# Patient Record
Sex: FEMALE | Race: WHITE | Employment: PART TIME | URBAN - METROPOLITAN AREA
[De-identification: names, ages, dates, MRNs, and addresses within clinical notes are randomized per-mention and may not be internally consistent; named-entity substitution may affect disease eponyms.]

---

## 2021-10-08 ENCOUNTER — OFFICE VISIT (OUTPATIENT)
Dept: FAMILY MEDICINE CLINIC | Age: 36
End: 2021-10-08
Payer: COMMERCIAL

## 2021-10-08 VITALS
HEART RATE: 92 BPM | DIASTOLIC BLOOD PRESSURE: 86 MMHG | HEIGHT: 65 IN | WEIGHT: 278 LBS | OXYGEN SATURATION: 98 % | TEMPERATURE: 97.3 F | SYSTOLIC BLOOD PRESSURE: 132 MMHG | BODY MASS INDEX: 46.32 KG/M2 | RESPIRATION RATE: 16 BRPM

## 2021-10-08 DIAGNOSIS — Z00.00 ENCOUNTER FOR WELL ADULT EXAM WITHOUT ABNORMAL FINDINGS: Primary | ICD-10-CM

## 2021-10-08 DIAGNOSIS — M62.838 TRAPEZIUS MUSCLE SPASM: ICD-10-CM

## 2021-10-08 DIAGNOSIS — E78.00 HYPERCHOLESTEREMIA: ICD-10-CM

## 2021-10-08 DIAGNOSIS — Z11.59 ENCOUNTER FOR HEPATITIS C SCREENING TEST FOR LOW RISK PATIENT: ICD-10-CM

## 2021-10-08 DIAGNOSIS — F31.9 BIPOLAR 1 DISORDER, DEPRESSED (HCC): ICD-10-CM

## 2021-10-08 DIAGNOSIS — G89.29 CHRONIC PAIN OF LEFT KNEE: ICD-10-CM

## 2021-10-08 DIAGNOSIS — M25.562 CHRONIC PAIN OF LEFT KNEE: ICD-10-CM

## 2021-10-08 DIAGNOSIS — Z11.4 SCREENING FOR HIV (HUMAN IMMUNODEFICIENCY VIRUS): ICD-10-CM

## 2021-10-08 DIAGNOSIS — E03.9 ACQUIRED HYPOTHYROIDISM: ICD-10-CM

## 2021-10-08 DIAGNOSIS — R73.03 PREDIABETES: ICD-10-CM

## 2021-10-08 DIAGNOSIS — M79.89 SWELLING OF LOWER EXTREMITY: ICD-10-CM

## 2021-10-08 PROCEDURE — 6360000002 HC RX W HCPCS

## 2021-10-08 PROCEDURE — G8482 FLU IMMUNIZE ORDER/ADMIN: HCPCS | Performed by: FAMILY MEDICINE

## 2021-10-08 PROCEDURE — G8427 DOCREV CUR MEDS BY ELIG CLIN: HCPCS | Performed by: FAMILY MEDICINE

## 2021-10-08 PROCEDURE — 99203 OFFICE O/P NEW LOW 30 MIN: CPT | Performed by: FAMILY MEDICINE

## 2021-10-08 PROCEDURE — 99204 OFFICE O/P NEW MOD 45 MIN: CPT | Performed by: FAMILY MEDICINE

## 2021-10-08 PROCEDURE — 90686 IIV4 VACC NO PRSV 0.5 ML IM: CPT

## 2021-10-08 PROCEDURE — G8417 CALC BMI ABV UP PARAM F/U: HCPCS | Performed by: FAMILY MEDICINE

## 2021-10-08 PROCEDURE — G0008 ADMIN INFLUENZA VIRUS VAC: HCPCS

## 2021-10-08 PROCEDURE — 4004F PT TOBACCO SCREEN RCVD TLK: CPT | Performed by: FAMILY MEDICINE

## 2021-10-08 RX ORDER — TRAZODONE HYDROCHLORIDE 100 MG/1
100 TABLET ORAL NIGHTLY
COMMUNITY
End: 2022-02-10 | Stop reason: SDUPTHER

## 2021-10-08 RX ORDER — CLONIDINE HYDROCHLORIDE 0.1 MG/1
0.1 TABLET ORAL DAILY
COMMUNITY

## 2021-10-08 RX ORDER — DULOXETIN HYDROCHLORIDE 60 MG/1
60 CAPSULE, DELAYED RELEASE ORAL DAILY
COMMUNITY
End: 2022-02-10

## 2021-10-08 RX ORDER — LAMOTRIGINE 100 MG/1
100 TABLET ORAL DAILY
COMMUNITY
End: 2022-02-10 | Stop reason: SDUPTHER

## 2021-10-08 RX ORDER — GABAPENTIN 400 MG/1
400 CAPSULE ORAL 3 TIMES DAILY
COMMUNITY
End: 2022-02-08 | Stop reason: SDUPTHER

## 2021-10-08 RX ORDER — FUROSEMIDE 20 MG/1
20 TABLET ORAL DAILY
Qty: 30 TABLET | Refills: 3 | Status: SHIPPED
Start: 2021-10-08 | End: 2022-02-10 | Stop reason: SDUPTHER

## 2021-10-08 RX ORDER — HYDROXYZINE 50 MG/1
50 TABLET, FILM COATED ORAL 3 TIMES DAILY PRN
COMMUNITY
End: 2022-02-10 | Stop reason: SDUPTHER

## 2021-10-08 RX ORDER — DULOXETIN HYDROCHLORIDE 30 MG/1
30 CAPSULE, DELAYED RELEASE ORAL DAILY
COMMUNITY
End: 2022-02-10

## 2021-10-08 RX ORDER — LEVOTHYROXINE SODIUM 0.05 MG/1
50 TABLET ORAL DAILY
COMMUNITY
End: 2022-02-10 | Stop reason: SDUPTHER

## 2021-10-08 RX ORDER — QUETIAPINE FUMARATE 25 MG/1
25 TABLET, FILM COATED ORAL DAILY
COMMUNITY
End: 2022-02-10 | Stop reason: SDUPTHER

## 2021-10-08 RX ORDER — METHOCARBAMOL 750 MG/1
750 TABLET, FILM COATED ORAL 4 TIMES DAILY
COMMUNITY
End: 2021-10-08

## 2021-10-08 RX ORDER — FUROSEMIDE 20 MG/1
20 TABLET ORAL DAILY
COMMUNITY
End: 2021-10-08 | Stop reason: CLARIF

## 2021-10-08 RX ORDER — PRAZOSIN HYDROCHLORIDE 2 MG/1
2 CAPSULE ORAL NIGHTLY
COMMUNITY
End: 2022-02-10 | Stop reason: SDUPTHER

## 2021-10-08 ASSESSMENT — COLUMBIA-SUICIDE SEVERITY RATING SCALE - C-SSRS
1. WITHIN THE PAST MONTH, HAVE YOU WISHED YOU WERE DEAD OR WISHED YOU COULD GO TO SLEEP AND NOT WAKE UP?: YES
6. HAVE YOU EVER DONE ANYTHING, STARTED TO DO ANYTHING, OR PREPARED TO DO ANYTHING TO END YOUR LIFE?: YES
2. HAVE YOU ACTUALLY HAD ANY THOUGHTS OF KILLING YOURSELF?: NO
7. DID THIS OCCUR IN THE LAST THREE MONTHS: NO

## 2021-10-08 ASSESSMENT — ENCOUNTER SYMPTOMS
SHORTNESS OF BREATH: 0
ABDOMINAL PAIN: 0
NAUSEA: 0
DIARRHEA: 0
WHEEZING: 0
COUGH: 0
VOMITING: 0
CONSTIPATION: 0

## 2021-10-08 ASSESSMENT — PATIENT HEALTH QUESTIONNAIRE - PHQ9
7. TROUBLE CONCENTRATING ON THINGS, SUCH AS READING THE NEWSPAPER OR WATCHING TELEVISION: 3
1. LITTLE INTEREST OR PLEASURE IN DOING THINGS: 3
8. MOVING OR SPEAKING SO SLOWLY THAT OTHER PEOPLE COULD HAVE NOTICED. OR THE OPPOSITE, BEING SO FIGETY OR RESTLESS THAT YOU HAVE BEEN MOVING AROUND A LOT MORE THAN USUAL: 3
SUM OF ALL RESPONSES TO PHQ QUESTIONS 1-9: 27
SUM OF ALL RESPONSES TO PHQ QUESTIONS 1-9: 27
4. FEELING TIRED OR HAVING LITTLE ENERGY: 3
5. POOR APPETITE OR OVEREATING: 3
3. TROUBLE FALLING OR STAYING ASLEEP: 3
SUM OF ALL RESPONSES TO PHQ9 QUESTIONS 1 & 2: 6
10. IF YOU CHECKED OFF ANY PROBLEMS, HOW DIFFICULT HAVE THESE PROBLEMS MADE IT FOR YOU TO DO YOUR WORK, TAKE CARE OF THINGS AT HOME, OR GET ALONG WITH OTHER PEOPLE: 2
6. FEELING BAD ABOUT YOURSELF - OR THAT YOU ARE A FAILURE OR HAVE LET YOURSELF OR YOUR FAMILY DOWN: 3
9. THOUGHTS THAT YOU WOULD BE BETTER OFF DEAD, OR OF HURTING YOURSELF: 3
2. FEELING DOWN, DEPRESSED OR HOPELESS: 3
SUM OF ALL RESPONSES TO PHQ QUESTIONS 1-9: 24

## 2021-10-08 NOTE — PATIENT INSTRUCTIONS
Patient Education        Neck Spasm: Exercises  Introduction  Here are some examples of exercises for you to try. The exercises may be suggested for a condition or for rehabilitation. Start each exercise slowly. Ease off the exercises if you start to have pain. You will be told when to start these exercises and which ones will work best for you. How to do the exercises  Levator scapula stretch    1. Sit in a firm chair, or stand up straight. 2. Gently tilt your head toward your left shoulder. 3. Turn your head to look down into your armpit, bending your head slightly forward. Let the weight of your head stretch your neck muscles. 4. Hold for 15 to 30 seconds. 5. Return to your starting position. 6. Follow the same instructions above, but tilt your head toward your right shoulder. 7. Repeat 2 to 4 times toward each shoulder. Upper trapezius stretch    1. Sit in a firm chair, or stand up straight. 2. This stretch works best if you keep your shoulder down as you lean away from it. To help you remember to do this, start by relaxing your shoulders and lightly holding on to your thighs or your chair. 3. Tilt your head toward your shoulder and hold for 15 to 30 seconds. Let the weight of your head stretch your muscles. 4. If you would like a little added stretch, place your arm behind your back. Use the arm opposite of the direction you are tilting your head. For example, if you are tilting your head to the left, place your right arm behind your back. 5. Repeat 2 to 4 times toward each shoulder. Neck rotation    1. Sit in a firm chair, or stand up straight. 2. Keeping your chin level, turn your head to the right, and hold for 15 to 30 seconds. 3. Turn your head to the left, and hold for 15 to 30 seconds. 4. Repeat 2 to 4 times to each side. Chin tuck    1. Lie on the floor with a rolled-up towel under your neck. Your head should be touching the floor.   2. Slowly bring your chin toward the front of your neck.  3. Hold for a count of 6, and then relax for up to 10 seconds. 4. Repeat 8 to 12 times. Forward neck flexion    1. Sit in a firm chair, or stand up straight. 2. Bend your head forward. 3. Hold for 15 to 30 seconds, then return to your starting position. 4. Repeat 2 to 4 times. Follow-up care is a key part of your treatment and safety. Be sure to make and go to all appointments, and call your doctor if you are having problems. It's also a good idea to know your test results and keep a list of the medicines you take. Where can you learn more? Go to [email or web address] and sign in to your [employer] account. Enter P962 in the Tekora box to learn more about \"Neck Spasm: Exercises. \"     If you do not have an account, please click on the \"Sign Up Now\" link. Current as of: July 1, 2021               Content Version: 13.0  © 2006-2021 Healthwise[employer] Inc[employer]. Care instructions adapted under license by [facility] ([facility]). If you have questions about a medical condition or this instruction, always ask your healthcare professional. [place] [address] any warranty or liability for your use of this information.

## 2021-10-08 NOTE — PROGRESS NOTES
[facility]  FAMILY MEDICINE RESIDENCY PROGRAM  DATE OF VISIT : 10/8/2021    Patient : Darryl Freeman   Age : 39 y.o.  : 1985   MRN : <[id number]>   ______________________________________________________________________    Chief Complaint :   Chief Complaint   Patient presents with    New Patient    Knee Pain     left    Other     upper back pain        HPI : Darryl Freeman is 39 y.o. female who presented to the clinic today for new patient encounter. Was told she had elevated cholesterol and was prediabetic prior to leaving [facility]. Moved here early august.     Anxiety: Hydroxizine 50mg TID (only takes 100mg in the am, intermittently takes another 50mg if absolutely necessary). Bipolar currently Depressed: Cymbalta 90mg daily (30mg + 60mg). SIGECAPS completely positive. Previous history of suicide attempts x1 about [id number]. She continues to have ideations and intermittent urges but denies having a plan and notes she notes that having her kids back and her success of finally having the power to do a fresh start and doing it for her kids keeps her from having a plan and going through. She feels she has insight of her emotions now and is working through them. Mood disorder (bipolar, borderline personality disorder): Lamictal 100mg daily. Quietapine and Seroquel as needed. Hypothyroidism: Levothyroxine 50mcg daily. Diagnosed for about 9yrs. Previous history of nephrolithiasis with SABINA. No issues since. Recurrent back spasms. Methocarbamol does not seem to be helping. Left knee pain: fell in April, was walking and her left knee just gave out and she fell forward, states her knee twisted outwards. During the fall she also sustained a fracture of her left wrist that pinged a nerve and required ORIF. States her knee feels weak and intermittenlty gives out on her sicne. Patient notes she has significant lower extremity swelling if she stops her lasix.  Has been on 20mg daily of lasix for about 2.5yrs. I reviewed the patient's past medications, allergies and past medical history during this visit. Past Medical History :    Health Maintenance-   Colonoscopy- - for IBS diagnosis    Ob/Gyn:  Agustina Barreto   LMP- 2021  Last PAP smear- about 9yrs- all abnormal in the past (precancerous cells, HPV, had previous colposcopies and leeps)   Ob Hx- - miscarriage. Both uncomplicated c-sections   Mammogram- NA      Past Medical History:   Diagnosis Date    Anxiety     Depression     Hyperlipidemia     Hypothyroidism     Mood disorder (HCC)      Past Surgical History:   Procedure Laterality Date     SECTION      CHOLECYSTECTOMY      FRACTURE SURGERY      left arm        Social History :  Social History     Tobacco History     Smoking Status  Current Every Day Smoker    Smokeless Tobacco Use  Never Used          Alcohol History     Alcohol Use Status  Not Asked          Drug Use     Drug Use Status  Not Asked          Sexual Activity     Sexually Active  Not Asked                 Allergies :   No Known Allergies    Medication List :    Current Outpatient Medications   Medication Sig Dispense Refill    gabapentin (NEURONTIN) 400 MG capsule Take 400 mg by mouth 3 times daily.       DULoxetine (CYMBALTA) 30 MG extended release capsule Take 30 mg by mouth daily      DULoxetine (CYMBALTA) 60 MG extended release capsule Take 60 mg by mouth daily      levothyroxine (SYNTHROID) 50 MCG tablet Take 50 mcg by mouth Daily      lamoTRIgine (LAMICTAL) 100 MG tablet Take 100 mg by mouth daily      hydrOXYzine (ATARAX) 50 MG tablet Take 50 mg by mouth 3 times daily as needed for Itching      prazosin (MINIPRESS) 2 MG capsule Take 2 mg by mouth nightly      QUEtiapine (SEROQUEL) 25 MG tablet Take 25 mg by mouth daily      traZODone (DESYREL) 100 MG tablet Take 100 mg by mouth nightly      cloNIDine (CATAPRES) 0.1 MG tablet Take 0.1 mg by mouth daily      furosemide (LASIX) 20 MG tablet Take 1 tablet by mouth daily 30 tablet 3     No current facility-administered medications for this visit. Review of Systems :  Review of Systems   Constitutional: Negative for chills, fatigue and fever. Respiratory: Negative for cough, shortness of breath and wheezing. Cardiovascular: Negative for chest pain, palpitations and leg swelling. Gastrointestinal: Negative for abdominal pain, constipation, diarrhea, nausea and vomiting. Endocrine: Negative for polydipsia and polyuria. Genitourinary: Negative for dysuria. Neurological: Negative for dizziness, weakness, light-headedness, numbness and headaches. Psychiatric/Behavioral: Positive for self-injury and suicidal ideas. Negative for behavioral problems, decreased concentration, dysphoric mood and sleep disturbance. The patient is not nervous/anxious. ______________________________________________________________________    Physical Exam :    Vitals: /86 (Site: Right Upper Arm, Position: Sitting, Cuff Size: Medium Adult)   Pulse 92   Temp 97.3 °F (36.3 °C) (Temporal)   Resp 16   Ht 5' 4.6\" (1.641 m)   Wt 278 lb (126.1 kg)   LMP 08/15/2021   SpO2 98%   BMI 46.84 kg/m²   Physical Exam  Vitals reviewed. Constitutional:       General: She is not in acute distress. Appearance: Normal appearance. Cardiovascular:      Rate and Rhythm: Normal rate. Pulses: Normal pulses. Heart sounds: No murmur heard. Pulmonary:      Effort: Pulmonary effort is normal. No respiratory distress. Breath sounds: Normal breath sounds. No wheezing. Abdominal:      General: Bowel sounds are normal. There is no distension. Palpations: Abdomen is soft. Tenderness: There is no abdominal tenderness. Musculoskeletal:      Right lower leg: No edema. Left lower leg: No edema. Comments:  right trapezius muscle spasm to palpation   Left knee  - + crepitus, ttp over lateral joint line .  No swelling . Abnormal    Neurological:      General: No focal deficit present. Mental Status: She is alert. Psychiatric:         Attention and Perception: Attention normal.         Mood and Affect: Mood is anxious and depressed. Affect is tearful. Speech: Speech normal.         Behavior: Behavior normal. Behavior is cooperative. Thought Content: Thought content normal.         ___________________    Assessment & Plan :    1. Bipolar 1 disorder, depressed ([facility] [address]  -Currently depressed  -Suicidal ideations without plan or intention  -Currently on medications as described above  -Continue present management  -Extensive amount of counseling done during visit  -Patient recommended to start counseling recommended psych care for both counseling for her and her daughter as well as family counseling as needed    2. Prediabetes  -Previously told in the past that she was diabetic  -Never been on any medications  - COMPREHENSIVE METABOLIC PANEL; Future  - HEMOGLOBIN A1C; Future    3. Acquired hypothyroidism  -On levothyroxine 50 mcg daily  -Previously controlled for patient  -Recent weight gain  - TSH without Reflex; Future    4. Hypercholesteremia  -Previous told she had elevated cholesterol  -Never started on any medications  - LIPID PANEL; Future    5. Chronic pain of left knee  -Residual knee pain following a fall  -Concern for ligamentous injury  -Orthopedic surgery referral placed  - XR KNEE LEFT (3 VIEWS); Future  - MRI KNEE LEFT WO CONTRAST; Future    6. Swelling of lower extremity  -Continue Lasix 20 mg  - furosemide (LASIX) 20 MG tablet; Take 1 tablet by mouth daily  Dispense: 30 tablet; Refill: 3    7. Trapezius muscle spasm  -Recommend Voltaren gel over-the-counter  -Referral to massage therapy placed  -Home exercises and stretches provided  - External Referral To Massage Therapy    8. Screening for HIV (human immunodeficiency virus)  - HIV Screen; Future    9.  Encounter for hepatitis C screening test for low risk patient  - HEPATITIS C ANTIBODY; Future    10. Encounter for well adult exam   -Flu shot administered        Additional plan and future considerations:   RTO in 2 weeks for well woman exam, previous abnormal Pap smears. Return to Office: Return in about 2 weeks (around 10/22/2021) for [facility] [address] .     Gonzalez De La Torre MD   Case discussed with Dr. Lucas Ortiz

## 2021-10-08 NOTE — PROGRESS NOTES
Vaccine Information Sheet, \"Influenza - Inactivated\"  given to Isabella English, or parent/legal guardian of  Isabella English and verbalized understanding. Patient responses:    Have you ever had a reaction to a flu vaccine? No  Do you have any current illness? No  Have you ever had [place] Syndrome? No  Do you have a serious allergy to any of the follow: Neomycin, Polymyxin, Thimerosal, eggs or egg products? No    Flu vaccine given per order. Please see immunization tab. Risks and benefits explained. Current VIS given.       Immunizations Administered     Name Date Dose Route    Influenza, Quadv, IM, PF (6 mo and older Fluzone, Flulaval, Fluarix, and 3 yrs and older Afluria) 10/8/2021 0.5 mL Intramuscular    Site: Deltoid- Left    Lot: [id number]    NDC: 71642-310-36

## 2021-10-08 NOTE — PROGRESS NOTES
Attending Physician Statement    S:   Chief Complaint   Patient presents with    New Patient    Knee Pain     left    Other     upper back pain       Patient is a 39year old female here for new patient visit  Moved here for [place]. Has never been treated for   Has severe depression- on medication. Is on meds , has not seen psych yet. Bipolar, depression, anxiety, borderline personality disorder. Currently severely depressed. Has SI but no active plan. Lives with her mom and child. Got kid back from [place] foster care. Has two children. Knee gives out on her all time with knee instability. O: Blood pressure 132/86, pulse 92, temperature 97.3 °F (36.3 °C), temperature source Temporal, resp. rate 16, height 5' 4.6\" (1.641 m), weight 278 lb (126.1 kg), last menstrual period 08/15/2021, SpO2 98 %. Exam:   Heart - RRR   Lungs - clear   msk - right trapezius muscle spasm to palpation    Left knee  - + crepitus, ttp over lateral joint line . No swelling . Abnormal anterior and posterior drawer     A: left knee pain, bipolar disorder currently depressed, anxiety  P:  Mri of knee    Continue psych meds    Follow-up as ordered    Attending Attestation   I have discussed the case, including pertinent history and exam findings with the resident. I also have personally seen and examined the patient. I agree with the documented assessment and plan.

## 2021-10-15 ENCOUNTER — HOSPITAL ENCOUNTER (OUTPATIENT)
Age: 36
Discharge: HOME | End: 2021-10-17
Payer: COMMERCIAL

## 2021-10-15 ENCOUNTER — HOSPITAL ENCOUNTER (OUTPATIENT)
Age: 36
Discharge: HOME | End: 2021-10-15
Payer: COMMERCIAL

## 2021-10-15 ENCOUNTER — HOSPITAL ENCOUNTER (OUTPATIENT)
Dept: GENERAL RADIOLOGY | Age: 36
Discharge: HOME | End: 2021-10-17
Payer: COMMERCIAL

## 2021-10-15 DIAGNOSIS — G89.29 CHRONIC PAIN OF LEFT KNEE: ICD-10-CM

## 2021-10-15 DIAGNOSIS — M25.562 CHRONIC PAIN OF LEFT KNEE: ICD-10-CM

## 2021-10-15 DIAGNOSIS — R73.03 PREDIABETES: ICD-10-CM

## 2021-10-15 DIAGNOSIS — Z11.4 SCREENING FOR HIV (HUMAN IMMUNODEFICIENCY VIRUS): ICD-10-CM

## 2021-10-15 DIAGNOSIS — Z11.59 ENCOUNTER FOR HEPATITIS C SCREENING TEST FOR LOW RISK PATIENT: ICD-10-CM

## 2021-10-15 DIAGNOSIS — E03.9 ACQUIRED HYPOTHYROIDISM: ICD-10-CM

## 2021-10-15 DIAGNOSIS — E78.00 HYPERCHOLESTEREMIA: ICD-10-CM

## 2021-10-15 LAB
ALBUMIN SERPL-MCNC: 4.3 G/DL (ref 3.5–5.2)
ALP BLD-CCNC: 86 U/L (ref 35–104)
ALT SERPL-CCNC: 9 U/L (ref 0–32)
ANION GAP SERPL CALCULATED.3IONS-SCNC: 9 MMOL/L (ref 7–16)
AST SERPL-CCNC: 16 U/L (ref 0–31)
BILIRUB SERPL-MCNC: <0.2 MG/DL (ref 0–1.2)
BUN BLDV-MCNC: 8 MG/DL (ref 6–20)
CALCIUM SERPL-MCNC: 9.1 MG/DL (ref 8.6–10.2)
CHLORIDE BLD-SCNC: 104 MMOL/L (ref 98–107)
CHOLESTEROL, TOTAL: 161 MG/DL (ref 0–199)
CO2: 27 MMOL/L (ref 22–29)
CREAT SERPL-MCNC: 0.8 MG/DL (ref 0.5–1)
GFR AFRICAN AMERICAN: >60
GFR NON-AFRICAN AMERICAN: >60 ML/MIN/1.73
GLUCOSE BLD-MCNC: 96 MG/DL (ref 74–99)
HBA1C MFR BLD: 5.3 % (ref 4–5.6)
HDLC SERPL-MCNC: 32 MG/DL
LDL CHOLESTEROL CALCULATED: 89 MG/DL (ref 0–99)
POTASSIUM SERPL-SCNC: 4.3 MMOL/L (ref 3.5–5)
SODIUM BLD-SCNC: 140 MMOL/L (ref 132–146)
TOTAL PROTEIN: 7.3 G/DL (ref 6.4–8.3)
TRIGL SERPL-MCNC: 198 MG/DL (ref 0–149)
TSH SERPL DL<=0.05 MIU/L-ACNC: 0.45 UIU/ML (ref 0.27–4.2)
VLDLC SERPL CALC-MCNC: 40 MG/DL

## 2021-10-15 PROCEDURE — 73562 X-RAY EXAM OF KNEE 3: CPT

## 2021-10-15 PROCEDURE — 80053 COMPREHEN METABOLIC PANEL: CPT

## 2021-10-15 PROCEDURE — 84443 ASSAY THYROID STIM HORMONE: CPT

## 2021-10-15 PROCEDURE — 86703 HIV-1/HIV-2 1 RESULT ANTBDY: CPT

## 2021-10-15 PROCEDURE — 80061 LIPID PANEL: CPT

## 2021-10-15 PROCEDURE — 36415 COLL VENOUS BLD VENIPUNCTURE: CPT

## 2021-10-15 PROCEDURE — 86803 HEPATITIS C AB TEST: CPT

## 2021-10-15 PROCEDURE — 83036 HEMOGLOBIN GLYCOSYLATED A1C: CPT

## 2021-10-18 LAB
HEPATITIS C ANTIBODY INTERPRETATION: NORMAL
HIV-1 AND HIV-2 ANTIBODIES: NORMAL

## 2022-01-18 RX ORDER — METHOCARBAMOL 750 MG/1
750 TABLET, FILM COATED ORAL 4 TIMES DAILY
Qty: 120 TABLET | Refills: 1 | OUTPATIENT
Start: 2022-01-18 | End: 2022-02-17

## 2022-01-21 ENCOUNTER — NURSE TRIAGE (OUTPATIENT)
Dept: OTHER | Facility: CLINIC | Age: 37
End: 2022-01-21

## 2022-01-21 NOTE — TELEPHONE ENCOUNTER
Received call from [facility] with Red Flag Complaint. Subjective: Caller states \"I am having swelling of her legs\"     Current Symptoms: swelling of bilateral legs- mid calf, she is on lasix and feels like it is no longer working    Onset: 2 weeks ago; gradual    Pain Severity: painful at times when walking around    Temperature: denies     What has been tried: she is on lasix    Recommended disposition: to be seen today    Care advice provided, patient verbalizes understanding; denies any other questions or concerns; instructed to call back for any new or worsening symptoms. Writer provided warm transfer to [facility]  at [facility] for appointment scheduling     Attention Provider: Thank you for allowing me to participate in the care of your patient. The patient was connected to triage in response to information provided to the ECC/PSC. Please do not respond through this encounter as the response is not directed to a shared pool.     Reason for Disposition   Patient wants to be seen    Protocols used: LEG SWELLING AND EDEMA-ADULT-OH

## 2022-02-08 DIAGNOSIS — M79.89 SWELLING OF LOWER EXTREMITY: ICD-10-CM

## 2022-02-10 RX ORDER — LEVOTHYROXINE SODIUM 0.05 MG/1
50 TABLET ORAL DAILY
Qty: 30 TABLET | Refills: 0 | Status: SHIPPED | OUTPATIENT
Start: 2022-02-10

## 2022-02-10 RX ORDER — FUROSEMIDE 20 MG/1
20 TABLET ORAL DAILY
Qty: 30 TABLET | Refills: 3 | Status: SHIPPED | OUTPATIENT
Start: 2022-02-10

## 2022-02-10 RX ORDER — TRAZODONE HYDROCHLORIDE 100 MG/1
100 TABLET ORAL NIGHTLY
Qty: 30 TABLET | Refills: 0 | Status: SHIPPED | OUTPATIENT
Start: 2022-02-10

## 2022-02-10 RX ORDER — LAMOTRIGINE 100 MG/1
100 TABLET ORAL DAILY
Qty: 30 TABLET | Refills: 0 | Status: SHIPPED | OUTPATIENT
Start: 2022-02-10

## 2022-02-10 RX ORDER — GABAPENTIN 400 MG/1
400 CAPSULE ORAL 3 TIMES DAILY
Qty: 90 CAPSULE | Refills: 0 | Status: SHIPPED | OUTPATIENT
Start: 2022-02-10 | End: 2022-03-12

## 2022-02-10 RX ORDER — PRAZOSIN HYDROCHLORIDE 2 MG/1
2 CAPSULE ORAL NIGHTLY
Qty: 30 CAPSULE | Refills: 0 | Status: SHIPPED | OUTPATIENT
Start: 2022-02-10

## 2022-02-10 RX ORDER — QUETIAPINE FUMARATE 25 MG/1
25 TABLET, FILM COATED ORAL DAILY
Qty: 30 TABLET | Refills: 0 | Status: SHIPPED | OUTPATIENT
Start: 2022-02-10 | End: 2022-03-12

## 2022-02-10 RX ORDER — HYDROXYZINE 50 MG/1
50 TABLET, FILM COATED ORAL 3 TIMES DAILY PRN
Qty: 90 TABLET | Refills: 0 | Status: SHIPPED | OUTPATIENT
Start: 2022-02-10

## 2022-03-07 RX ORDER — LEVOTHYROXINE SODIUM 0.05 MG/1
TABLET ORAL
Qty: 30 TABLET | Refills: 0 | OUTPATIENT
Start: 2022-03-07

## 2025-06-13 ENCOUNTER — OFFICE VISIT (OUTPATIENT)
Dept: PRIMARY CARE CLINIC | Age: 40
End: 2025-06-13
Payer: COMMERCIAL

## 2025-06-13 VITALS
HEIGHT: 65 IN | HEART RATE: 85 BPM | WEIGHT: 248 LBS | DIASTOLIC BLOOD PRESSURE: 85 MMHG | BODY MASS INDEX: 41.32 KG/M2 | TEMPERATURE: 98.2 F | SYSTOLIC BLOOD PRESSURE: 128 MMHG | OXYGEN SATURATION: 98 %

## 2025-06-13 DIAGNOSIS — L73.2 LEFT AXILLARY HIDRADENITIS: ICD-10-CM

## 2025-06-13 DIAGNOSIS — L73.2 LEFT AXILLARY HIDRADENITIS: Primary | ICD-10-CM

## 2025-06-13 PROCEDURE — 96372 THER/PROPH/DIAG INJ SC/IM: CPT | Performed by: NURSE PRACTITIONER

## 2025-06-13 PROCEDURE — 99213 OFFICE O/P EST LOW 20 MIN: CPT | Performed by: NURSE PRACTITIONER

## 2025-06-13 RX ORDER — SULFAMETHOXAZOLE AND TRIMETHOPRIM 800; 160 MG/1; MG/1
1 TABLET ORAL 2 TIMES DAILY
Qty: 20 TABLET | Refills: 0 | Status: SHIPPED | OUTPATIENT
Start: 2025-06-13 | End: 2025-06-23

## 2025-06-13 RX ORDER — SULFAMETHOXAZOLE AND TRIMETHOPRIM 800; 160 MG/1; MG/1
1 TABLET ORAL 2 TIMES DAILY
Qty: 20 TABLET | Refills: 0 | Status: SHIPPED | OUTPATIENT
Start: 2025-06-13 | End: 2025-06-13 | Stop reason: SDUPTHER

## 2025-06-13 RX ORDER — CEFTRIAXONE 1 G/1
1000 INJECTION, POWDER, FOR SOLUTION INTRAMUSCULAR; INTRAVENOUS ONCE
Status: COMPLETED | OUTPATIENT
Start: 2025-06-13 | End: 2025-06-13

## 2025-06-13 RX ORDER — DOXYCYCLINE HYCLATE 100 MG
100 TABLET ORAL 2 TIMES DAILY
Qty: 20 TABLET | Refills: 0 | Status: SHIPPED | OUTPATIENT
Start: 2025-06-13 | End: 2025-06-13 | Stop reason: SDUPTHER

## 2025-06-13 RX ORDER — DOXYCYCLINE HYCLATE 100 MG
100 TABLET ORAL 2 TIMES DAILY
Qty: 20 TABLET | Refills: 0 | Status: SHIPPED | OUTPATIENT
Start: 2025-06-13 | End: 2025-06-23

## 2025-06-13 RX ORDER — HYDROCODONE BITARTRATE AND ACETAMINOPHEN 5; 325 MG/1; MG/1
1 TABLET ORAL EVERY 8 HOURS PRN
Qty: 12 TABLET | Refills: 0 | Status: SHIPPED | OUTPATIENT
Start: 2025-06-13 | End: 2025-06-17

## 2025-06-13 RX ORDER — HYDROCODONE BITARTRATE AND ACETAMINOPHEN 5; 325 MG/1; MG/1
1 TABLET ORAL EVERY 8 HOURS PRN
Qty: 12 TABLET | Refills: 0 | Status: SHIPPED | OUTPATIENT
Start: 2025-06-13 | End: 2025-06-13 | Stop reason: SDUPTHER

## 2025-06-13 RX ADMIN — CEFTRIAXONE 1000 MG: 1 INJECTION, POWDER, FOR SOLUTION INTRAMUSCULAR; INTRAVENOUS at 15:52

## 2025-06-13 NOTE — PROGRESS NOTES
interference with activities of daily living, quality of family life and social activities, and the physical activity)